# Patient Record
Sex: MALE | Race: WHITE | NOT HISPANIC OR LATINO | URBAN - METROPOLITAN AREA
[De-identification: names, ages, dates, MRNs, and addresses within clinical notes are randomized per-mention and may not be internally consistent; named-entity substitution may affect disease eponyms.]

---

## 2021-02-11 ENCOUNTER — TELEPHONE ENCOUNTER (OUTPATIENT)
Dept: URBAN - METROPOLITAN AREA CLINIC 35 | Facility: CLINIC | Age: 66
End: 2021-02-11

## 2021-02-16 ENCOUNTER — OFFICE VISIT (OUTPATIENT)
Dept: URBAN - METROPOLITAN AREA CLINIC 31 | Facility: CLINIC | Age: 66
End: 2021-02-16

## 2021-02-16 VITALS — HEIGHT: 72 IN | BODY MASS INDEX: 27.77 KG/M2 | WEIGHT: 205 LBS

## 2021-02-16 RX ORDER — VITAMIN K2 90 MCG
1 CAPSULE CAPSULE ORAL ONCE A DAY
Status: ACTIVE | COMMUNITY

## 2021-02-16 RX ORDER — SODIUM PICOSULFATE, MAGNESIUM OXIDE, AND ANHYDROUS CITRIC ACID 10; 3.5; 12 MG/16.1G; G/16.1G; G/16.1G
AS DIRECTED POWDER, METERED ORAL 1
Qty: 1 | Refills: 0 | Status: DISCONTINUED | COMMUNITY
Start: 2017-03-22

## 2021-02-16 RX ORDER — ALUMINUM ZIRCONIUM TRICHLOROHYDREX GLY 0.19 G/G
1 STICK TOPICAL ONCE A DAY
Status: ACTIVE | COMMUNITY

## 2021-02-16 NOTE — HPI-MIGRATED HPI
;     Hernia : Patient presents today for an office visit for a lump on his abdomen. His last office visit was on 05/11/2017 for a colonoscopy with Dr. Sorensen. He did not follow-up after having the colonoscopy. The alerts in EMR show that patient was due for a surveillance colonoscopy on 02/15/2021. Patient states that he has noticed some swelling on the left side of his abdomen. The swelling started on the left side of his abdomen just below his left rib cage. Patient states that if he stands up, he can see an outline "of something" about the size of his hand. He states that the lump feels "heavy". The lump does cause discomfort and patient states that the discomfort is progressing. On January 29th, patient had his first Moderna injection. His arm was sore but more problematic was the exasperation of discomfort in the location of the "lump". The swelling and size of the lump has not changed since taking the Moderna injection. Patient has not has any imaging, testing, ER visits, or other doctor visits for evaluation.  He denies any diarrhea, change in bowel habit, or constipation.  No exacerbating or alleviating factors is noted.;

## 2021-02-16 NOTE — EXAM-MIGRATED EXAMINATIONS
GENERAL APPEARANCE: - alert, in no acute distress, well developed, well nourished;   ABDOMEN: - Slight bulge on left side.;

## 2021-02-19 ENCOUNTER — TELEPHONE ENCOUNTER (OUTPATIENT)
Dept: URBAN - METROPOLITAN AREA CLINIC 35 | Facility: CLINIC | Age: 66
End: 2021-02-19

## 2021-02-24 ENCOUNTER — TELEPHONE ENCOUNTER (OUTPATIENT)
Dept: URBAN - METROPOLITAN AREA CLINIC 35 | Facility: CLINIC | Age: 66
End: 2021-02-24

## 2021-03-31 ENCOUNTER — OFFICE VISIT (OUTPATIENT)
Dept: URBAN - METROPOLITAN AREA CLINIC 31 | Facility: CLINIC | Age: 66
End: 2021-03-31

## 2021-03-31 VITALS
HEART RATE: 84 BPM | WEIGHT: 208 LBS | HEIGHT: 72 IN | SYSTOLIC BLOOD PRESSURE: 138 MMHG | OXYGEN SATURATION: 97 % | DIASTOLIC BLOOD PRESSURE: 84 MMHG | BODY MASS INDEX: 28.17 KG/M2

## 2021-03-31 PROBLEM — 197321007: Status: ACTIVE | Noted: 2021-03-31

## 2021-03-31 PROBLEM — 56689002 CROHN'S DISEASE OF SMALL INTESTINE: Status: ACTIVE | Noted: 2017-03-22

## 2021-03-31 PROBLEM — 428283002 HISTORY OF POLYP OF COLON (SITUATION): Status: ACTIVE | Noted: 2017-03-22

## 2021-03-31 RX ORDER — ALUMINUM ZIRCONIUM TRICHLOROHYDREX GLY 0.19 G/G
1 STICK TOPICAL ONCE A DAY
Status: ACTIVE | COMMUNITY

## 2021-03-31 RX ORDER — VITAMIN K2 90 MCG
1 CAPSULE CAPSULE ORAL ONCE A DAY
Status: ACTIVE | COMMUNITY

## 2024-05-14 ENCOUNTER — DASHBOARD ENCOUNTERS (OUTPATIENT)
Age: 69
End: 2024-05-14

## 2024-05-14 ENCOUNTER — OFFICE VISIT (OUTPATIENT)
Dept: URBAN - METROPOLITAN AREA CLINIC 35 | Facility: CLINIC | Age: 69
End: 2024-05-14
Payer: MEDICARE

## 2024-05-14 VITALS
OXYGEN SATURATION: 97 % | SYSTOLIC BLOOD PRESSURE: 118 MMHG | HEIGHT: 72 IN | WEIGHT: 197 LBS | DIASTOLIC BLOOD PRESSURE: 72 MMHG | BODY MASS INDEX: 26.68 KG/M2 | HEART RATE: 87 BPM

## 2024-05-14 DIAGNOSIS — K57.30 DIVERTICULOSIS OF LARGE INTESTINE WITHOUT PERFORATION OR ABSCESS WITHOUT BLEEDING: ICD-10-CM

## 2024-05-14 DIAGNOSIS — I25.10 CORONARY ARTERY DISEASE, UNSPECIFIED VESSEL OR LESION TYPE, UNSPECIFIED WHETHER ANGINA PRESENT, UNSPECIFIED WHETHER NATIVE OR TRANSPLANTED HEART: ICD-10-CM

## 2024-05-14 DIAGNOSIS — Z86.010 PERSONAL HISTORY OF COLONIC POLYPS: ICD-10-CM

## 2024-05-14 DIAGNOSIS — Z95.5 H/O HEART ARTERY STENT: ICD-10-CM

## 2024-05-14 PROBLEM — 428375006: Status: ACTIVE | Noted: 2024-05-14

## 2024-05-14 PROBLEM — 724538004: Status: ACTIVE | Noted: 2024-05-14

## 2024-05-14 PROBLEM — 53741008: Status: ACTIVE | Noted: 2024-05-14

## 2024-05-14 PROCEDURE — 99203 OFFICE O/P NEW LOW 30 MIN: CPT | Performed by: INTERNAL MEDICINE

## 2024-05-14 RX ORDER — ASPIRIN 81 MG/1
1 TABLET TABLET, COATED ORAL ONCE A DAY
Status: ACTIVE | COMMUNITY

## 2024-05-14 RX ORDER — ALUMINUM ZIRCONIUM TRICHLOROHYDREX GLY 0.19 G/G
1 STICK TOPICAL ONCE A DAY
Status: ACTIVE | COMMUNITY

## 2024-05-14 RX ORDER — TICAGRELOR 90 MG/1
1 TABLET TABLET ORAL TWICE A DAY
Status: ACTIVE | COMMUNITY

## 2024-05-14 RX ORDER — VITAMIN K2 90 MCG
1 CAPSULE CAPSULE ORAL ONCE A DAY
Status: ACTIVE | COMMUNITY

## 2024-05-14 RX ORDER — TADALAFIL 10 MG/1
1 TABLET AS NEEDED TABLET, FILM COATED ORAL ONCE A DAY
Status: ACTIVE | COMMUNITY

## 2024-08-13 ENCOUNTER — OFFICE VISIT (OUTPATIENT)
Dept: URBAN - METROPOLITAN AREA CLINIC 35 | Facility: CLINIC | Age: 69
End: 2024-08-13